# Patient Record
Sex: FEMALE | Race: WHITE | Employment: FULL TIME | URBAN - METROPOLITAN AREA
[De-identification: names, ages, dates, MRNs, and addresses within clinical notes are randomized per-mention and may not be internally consistent; named-entity substitution may affect disease eponyms.]

---

## 2021-10-04 ENCOUNTER — OFFICE VISIT (OUTPATIENT)
Dept: URGENT CARE | Age: 66
End: 2021-10-04

## 2021-10-04 VITALS — HEART RATE: 76 BPM | RESPIRATION RATE: 18 BRPM | OXYGEN SATURATION: 95 % | TEMPERATURE: 98.2 F

## 2021-10-04 DIAGNOSIS — Z20.822 EXPOSURE TO COVID-19 VIRUS: Primary | ICD-10-CM

## 2021-10-04 PROCEDURE — 99202 OFFICE O/P NEW SF 15 MIN: CPT | Performed by: NURSE PRACTITIONER

## 2021-10-04 NOTE — PROGRESS NOTES
Subjective: (As above and below)       This patient was seen in [facility] at [address] [place] [address] [facility] while in their vehicle due to COVID-19 pandemic with PPE and focused examination in order to decrease community viral transmission. The patient/guardian gave verbal consent to treat. Chief Complaint   Patient presents with    Covid Testing     Pt. denies symptoms. Possible exposure last thur/fri      Oj Geronimo is a 61 y.o. female who presents for COVID-19 Exposure and testing. Known contact with: covid 19 positive individual  Currently has not tried any therapies. Feels well and denies any symptoms at this point in time. Recent travel: no  Known Exposure to COVID-19: YES    ROS- negative for dizziness, cough, SOB, rhinorrhea, myalgias, n/v/d, rashes, headaches, fevers, chills, chest pains. Review of Systems - negative except as listed above    Reviewed PmHx, RxHx, FmHx, SocHx, AllgHx and updated in chart.   Family History   Problem Relation Age of Onset    No Known Problems Mother     Hypertension Father     Arthritis-osteo Father     Heart Disease Maternal Grandmother     Heart Disease Paternal Grandfather        Past Medical History:   Diagnosis Date    Arthritis     OA of left knee    Depression     DJD (degenerative joint disease) of knee     left knee    Hypertension     Seizures ([facility] [address] 1975    [id number]      Social History     Socioeconomic History    Marital status:      Spouse name: Not on file    Number of children: Not on file    Years of education: Not on file    Highest education level: Not on file   Tobacco Use    Smoking status: Never Smoker    Smokeless tobacco: Never Used   Substance and Sexual Activity    Alcohol use: No    Drug use: No     Social Determinants of Health     Financial Resource Strain:     Difficulty of Paying Living Expenses:    Food Insecurity:     Worried About Running Out of Food in the Last Year:    [address] in the Last Year:    Transportation Needs:     Lack of Transportation (Medical):  Lack of Transportation (Non-Medical):    Physical Activity:     Days of Exercise per Week:     Minutes of Exercise per Session:    Stress:     Feeling of Stress :    Social Connections:     Frequency of Communication with Friends and Family:     Frequency of Social Gatherings with Friends and Family:     Attends Yarsanism Services:     Active Member of Clubs or Organizations:     Attends Club or Organization Meetings:     Marital Status:           Current Outpatient Medications   Medication Sig    lisinopril-hydrochlorothiazide (PRINZIDE, ZESTORETIC) 20-12.5 mg per tablet Take 1 Tab by mouth daily. Indications: HYPERTENSION    multivitamin (ONE A DAY) tablet Take 1 Tab by mouth daily.  calcium-cholecalciferol, d3, 600-125 mg-unit tab Take 1 Tab by mouth two (2) times a day.  oxyCODONE IR (ROXICODONE) 5 mg immediate release tablet Take 1-2 Tabs by mouth every four (4) hours as needed. Max Daily Amount: 60 mg. (Patient not taking: Reported on 10/4/2021)    senna-docusate (PERICOLACE) 8.6-50 mg per tablet Take 1 Tab by mouth two (2) times daily as needed for Constipation. (Patient not taking: Reported on 10/4/2021)    warfarin (COUMADIN) 2 mg tablet Take 2.5 tablets by mouth everyday at [facility] or as directed by your doctor. (Patient not taking: Reported on 10/4/2021)    citalopram (CELEXA) 20 mg tablet Take 20 mg by mouth daily. (Patient not taking: Reported on 10/4/2021)     No current facility-administered medications for this visit. Objective:     Vitals:    10/04/21 1548   Pulse: 76   Resp: 18   Temp: 98.2 °F (36.8 °C)   SpO2: 95%       Physical Exam  General appearance - appears well hydrated and does not appear toxic, no acute distress  Eyes - EOMs intact. Non injected.    Ears - no external swelling  Neck/Lymphatics - no obvious swelling  Chest - normal breathing effort. No active cough heard. No audible wheezing. Heart - HR-see vitals  Skin - no observable rashes or pallor  Neurologic- alert and oriented x 3  Psychiatric- normal mood, behavior and though content. Assessment/ Plan:     1. Exposure to COVID-19 virus    - NOVEL CORONAVIRUS (COVID-19); Future  - NOVEL CORONAVIRUS (COVID-19)      Will test for COVID-19 given exposure  Supportive home care for any development of mild symptoms - tylenol, maintain adequate fluid intake, deep breathing exercises  Self isolate/quarantine advised based on recommendations in current CDC guidelines.              Radha Chau, NP

## 2021-10-04 NOTE — LETTER
NOTIFICATION RETURN TO WORK / SCHOOL    10/4/2021 4:05 PM    Ms. Jazzmine Huerta [address] [place] [id number]      To Whom It May Concern:    Humberto Caballero is currently under the care of [facility]. Was tested for COVID 19. Results currently pending. Please excuse from work while waiting for these results. If there are questions or concerns please have the patient contact our office.         Sincerely,      [facility] PROVIDER

## 2021-10-06 LAB
SARS-COV-2, NAA 2 DAY TAT: NORMAL
SARS-COV-2, NAA: NOT DETECTED

## 2025-03-24 ENCOUNTER — TRANSCRIBE ORDERS (OUTPATIENT)
Facility: HOSPITAL | Age: 70
End: 2025-03-24

## 2025-03-24 DIAGNOSIS — R79.89 OTHER SPECIFIED ABNORMAL FINDINGS OF BLOOD CHEMISTRY: Primary | ICD-10-CM
